# Patient Record
Sex: MALE | Race: WHITE
[De-identification: names, ages, dates, MRNs, and addresses within clinical notes are randomized per-mention and may not be internally consistent; named-entity substitution may affect disease eponyms.]

---
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
## 2024-08-13 ENCOUNTER — HOSPITAL ENCOUNTER (EMERGENCY)
Dept: HOSPITAL 74 - JERFT | Age: 19
Discharge: HOME | End: 2024-08-13
Payer: COMMERCIAL

## 2024-08-13 VITALS — BODY MASS INDEX: 33.1 KG/M2

## 2024-08-13 VITALS
RESPIRATION RATE: 18 BRPM | HEART RATE: 98 BPM | DIASTOLIC BLOOD PRESSURE: 84 MMHG | TEMPERATURE: 98.6 F | SYSTOLIC BLOOD PRESSURE: 139 MMHG
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
## 2024-08-13 DIAGNOSIS — R42: ICD-10-CM
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
## 2024-08-13 DIAGNOSIS — H57.12: Primary | ICD-10-CM

## 2024-08-13 DIAGNOSIS — H53.8: ICD-10-CM

## 2024-08-13 DIAGNOSIS — R51.9: ICD-10-CM

## 2024-08-13 RX ADMIN — IBUPROFEN ONE MG: 600 TABLET, FILM COATED ORAL at 16:48

## 2024-08-14 LAB — HIV 1+2 AB+HIV1 P24 AG SERPL QL IA: NEGATIVE
